# Patient Record
Sex: FEMALE | Race: WHITE | URBAN - METROPOLITAN AREA
[De-identification: names, ages, dates, MRNs, and addresses within clinical notes are randomized per-mention and may not be internally consistent; named-entity substitution may affect disease eponyms.]

---

## 2023-03-02 ENCOUNTER — OFFICE VISIT (OUTPATIENT)
Dept: OTHER | Facility: HOSPITAL | Age: 23
End: 2023-03-02
Attending: PREVENTIVE MEDICINE

## 2023-03-02 DIAGNOSIS — Z02.1 PRE-EMPLOYMENT EXAMINATION: Primary | ICD-10-CM

## 2023-03-02 PROCEDURE — 82300 ASSAY OF CADMIUM: CPT | Performed by: PREVENTIVE MEDICINE

## 2023-03-02 PROCEDURE — 83825 ASSAY OF MERCURY: CPT | Performed by: PREVENTIVE MEDICINE

## 2023-03-02 PROCEDURE — 82175 ASSAY OF ARSENIC: CPT | Performed by: PREVENTIVE MEDICINE

## 2023-03-02 PROCEDURE — 83655 ASSAY OF LEAD: CPT | Performed by: PREVENTIVE MEDICINE

## 2023-03-05 LAB
ARSENIC, BLOOD: <10 UG/L
CADMIUM, BLOOD: <1 UG/L
LEAD, BLOOD (VENOUS): <2 UG/DL
MERCURY, BLOOD: <2.5 UG/L

## 2024-01-19 ENCOUNTER — APPOINTMENT (OUTPATIENT)
Dept: URBAN - METROPOLITAN AREA CLINIC 314 | Age: 24
Setting detail: DERMATOLOGY
End: 2024-01-19

## 2024-01-19 DIAGNOSIS — L30.4 ERYTHEMA INTERTRIGO: ICD-10-CM

## 2024-01-19 DIAGNOSIS — L40.8 OTHER PSORIASIS: ICD-10-CM

## 2024-01-19 PROCEDURE — OTHER ADDITIONAL NOTES: OTHER

## 2024-01-19 PROCEDURE — OTHER PRESCRIPTION: OTHER

## 2024-01-19 PROCEDURE — OTHER COUNSELING: OTHER

## 2024-01-19 PROCEDURE — 99203 OFFICE O/P NEW LOW 30 MIN: CPT

## 2024-01-19 PROCEDURE — OTHER MIPS QUALITY: OTHER

## 2024-01-19 RX ORDER — HYDROCORTISONE 25 MG/G
OINTMENT TOPICAL BID
Qty: 28.35 | Refills: 2 | Status: ERX | COMMUNITY
Start: 2024-01-19

## 2024-01-19 RX ORDER — CLOTRIMAZOLE 1 G/G
CREAM TOPICAL BID
Qty: 28 | Refills: 3 | Status: ERX | COMMUNITY
Start: 2024-01-19

## 2024-01-19 RX ORDER — CLOBETASOL PROPIONATE 0.5 MG/ML
SOLUTION TOPICAL
Qty: 50 | Refills: 3 | Status: ERX | COMMUNITY
Start: 2024-01-19

## 2024-01-19 ASSESSMENT — LOCATION DETAILED DESCRIPTION DERM
LOCATION DETAILED: RIGHT MEDIAL FRONTAL SCALP
LOCATION DETAILED: GLUTEAL CLEFT

## 2024-01-19 ASSESSMENT — LOCATION SIMPLE DESCRIPTION DERM
LOCATION SIMPLE: RIGHT SCALP
LOCATION SIMPLE: GLUTEAL CLEFT

## 2024-01-19 ASSESSMENT — LOCATION ZONE DERM
LOCATION ZONE: SCALP
LOCATION ZONE: TRUNK

## 2024-01-19 NOTE — PROCEDURE: ADDITIONAL NOTES
Detail Level: Simple
Additional Notes: likely from sweating from hot yoga\\n\\nhydrocortisone BID x 2 weeks, clotrimazole BID x 4 weeks. Declined miconazole powder today
Render Risk Assessment In Note?: no
Additional Notes: Use BID x 2 weeks then as needed after that

## 2025-02-06 ENCOUNTER — OFFICE VISIT (OUTPATIENT)
Dept: OCCUPATIONAL MEDICINE | Age: 25
End: 2025-02-06
Attending: FAMILY MEDICINE

## 2025-02-06 DIAGNOSIS — Z00.00 ANNUAL PHYSICAL EXAM: Primary | ICD-10-CM

## 2025-02-06 PROCEDURE — 83655 ASSAY OF LEAD: CPT

## 2025-02-06 PROCEDURE — 82175 ASSAY OF ARSENIC: CPT

## 2025-02-06 PROCEDURE — 83825 ASSAY OF MERCURY: CPT

## 2025-02-06 PROCEDURE — 82300 ASSAY OF CADMIUM: CPT

## 2025-02-14 LAB
ARSENIC BLOOD: 2 UG/L
CADMIUM, BLOOD: <0.5 UG/L
LEAD BLOOD: 1.9 UG/DL
MERCURY BLOOD: <1 UG/L